# Patient Record
(demographics unavailable — no encounter records)

---

## 2024-10-31 NOTE — HISTORY OF PRESENT ILLNESS
[de-identified] : Date of surgery: October 21, 2024 (10 days ago) [de-identified] : The patient is presenting for her postoperative visit status post left long finger dorsal soft tissue mass excision 10 days ago.  The patient's postoperative pain is well-controlled.  The patient denies any numbness or tingling of the left long finger. [de-identified] : The patient's left postop bandage/dressing was removed today.  The left long finger dorsal incision is clean and dry with no signs of infection.  No erythema or purulent discharge present.  Nylon sutures were removed and exchanged for Steri-Strips.  There is appropriately diminished left long finger range of motion.  All digits are well-perfused.  Sensation intact to light touch throughout the entire left hand. [de-identified] : My impression is that the patient is progressing very well status post left long finger dorsal soft tissue mass excision 10 days ago.  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  I discussed the next steps in treatment with the patient.  I advised her this includes working on her active range of motion to combat any postoperative stiffness that she is experiencing.  I recommended that the patient slowly and gradually incorporate the left hand into her normal activities.  I advised the patient that she can begin to get the left hand wet under running water after this upcoming weekend.  I recommended she follow-up with me in 3 weeks to monitor her progress.  She was in accordance with the plan and all questions were answered.

## 2024-10-31 NOTE — HISTORY OF PRESENT ILLNESS
[de-identified] : Date of surgery: October 21, 2024 (10 days ago) [de-identified] : The patient is presenting for her postoperative visit status post left long finger dorsal soft tissue mass excision 10 days ago.  The patient's postoperative pain is well-controlled.  The patient denies any numbness or tingling of the left long finger. [de-identified] : The patient's left postop bandage/dressing was removed today.  The left long finger dorsal incision is clean and dry with no signs of infection.  No erythema or purulent discharge present.  Nylon sutures were removed and exchanged for Steri-Strips.  There is appropriately diminished left long finger range of motion.  All digits are well-perfused.  Sensation intact to light touch throughout the entire left hand. [de-identified] : My impression is that the patient is progressing very well status post left long finger dorsal soft tissue mass excision 10 days ago.  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  I discussed the next steps in treatment with the patient.  I advised her this includes working on her active range of motion to combat any postoperative stiffness that she is experiencing.  I recommended that the patient slowly and gradually incorporate the left hand into her normal activities.  I advised the patient that she can begin to get the left hand wet under running water after this upcoming weekend.  I recommended she follow-up with me in 3 weeks to monitor her progress.  She was in accordance with the plan and all questions were answered.

## 2024-11-20 NOTE — HISTORY OF PRESENT ILLNESS
[de-identified] : Date of Surgery: October 21, 2024 (4 weeks, 3 days ago) [de-identified] : The patient returns for her second postoperative visit status post left long finger dorsal soft tissue mass excision; 5mm x 7mm.

## 2024-11-20 NOTE — HISTORY OF PRESENT ILLNESS
[de-identified] : Date of Surgery: October 21, 2024 (4 weeks, 3 days ago) [de-identified] : The patient returns for her second postoperative visit status post left long finger dorsal soft tissue mass excision; 5mm x 7mm.